# Patient Record
Sex: FEMALE | Race: BLACK OR AFRICAN AMERICAN | NOT HISPANIC OR LATINO | URBAN - NONMETROPOLITAN AREA
[De-identification: names, ages, dates, MRNs, and addresses within clinical notes are randomized per-mention and may not be internally consistent; named-entity substitution may affect disease eponyms.]

---

## 2022-07-21 ENCOUNTER — TELEHEALTH PROVIDED OTHER THAN IN PATIENT'S HOME (OUTPATIENT)
Dept: URBAN - NONMETROPOLITAN AREA CLINIC 9 | Facility: CLINIC | Age: 52
End: 2022-07-21

## 2022-07-21 VITALS
HEART RATE: 86 BPM | WEIGHT: 264 LBS | SYSTOLIC BLOOD PRESSURE: 113 MMHG | HEIGHT: 68 IN | DIASTOLIC BLOOD PRESSURE: 96 MMHG | RESPIRATION RATE: 16 BRPM

## 2022-07-21 DIAGNOSIS — R13.10 DYSPHAGIA, UNSPECIFIED: ICD-10-CM

## 2022-07-21 DIAGNOSIS — R63.4 ABNORMAL WEIGHT LOSS: ICD-10-CM

## 2022-07-21 DIAGNOSIS — R12 HEARTBURN: ICD-10-CM

## 2022-07-21 DIAGNOSIS — E11.9 TYPE 2 DIABETES MELLITUS WITHOUT COMPLICATIONS: ICD-10-CM

## 2022-07-21 DIAGNOSIS — K58.9 IRRITABLE BOWEL SYNDROME WITHOUT DIARRHEA: ICD-10-CM

## 2022-07-21 DIAGNOSIS — R14.0 ABDOMINAL DISTENSION (GASEOUS): ICD-10-CM

## 2022-07-21 PROCEDURE — 99203 OFFICE O/P NEW LOW 30 MIN: CPT

## 2022-07-21 NOTE — SERVICENOTES
This patient is being seen today via telehealth and is aware of the limitations of telemedicine and gives verbal consent to proceed with the visit. This visit was completed via audio/visual ZOOM due to the restrictions of the COVID-19 pandemic.  All issues as stated above were discussed and addressed with the patient.  This telemedicine visit took place with the patient in the office and my assistant, Terra Stephens LPN present.

Start time:09:20
End time:09:35

## 2022-07-21 NOTE — SERVICEHPINOTES
Leanna Landa   is a   52 yo  female   whom I am seeing as a new patient today. She has a history of  irritable bowel syndrome and recently diagnosed diabetes mellitus.  She was seen in the ER this past weekend  for elevated blood glucose of 491, fatigue , increased thirst, weakness. she was started on metformin but has been intolerant of the medication with nausea, vomiting and diarrhea.  She is following up with her PCP today to start insulin.  She would like to see Endocrinology.  She has been having intermittent episodes of dysphagia to solids, heartburn and change in bowel habits. she notes episodes of bloating, especially postprandial.  She was told she had a hiatal hernia.  She reports having a daily bowel movement with no bright red blood per rectum or melena.  She does note some cough and hoarseness.  She has been diagnosed with chronic rhinitis, asthma, atopic dermatitis. She has not had an EGD or colonoscopy.  She has a history of kidney stones s/p lithotripsy.    She has also had unintentional weight loss.  Her A1c was 10.9. Sodium 134, glucose 335, BUN 12, creatinine 0.96, LFTs within normal limits CBC normal. No anion gap.